# Patient Record
Sex: MALE | Race: WHITE | NOT HISPANIC OR LATINO | URBAN - METROPOLITAN AREA
[De-identification: names, ages, dates, MRNs, and addresses within clinical notes are randomized per-mention and may not be internally consistent; named-entity substitution may affect disease eponyms.]

---

## 2019-01-11 ENCOUNTER — HOSPITAL ENCOUNTER (OUTPATIENT)
Dept: GENERAL RADIOLOGY | Facility: HOSPITAL | Age: 24
Discharge: HOME | End: 2019-01-11
Attending: NEUROLOGICAL SURGERY

## 2019-03-18 ENCOUNTER — HOSPITAL ENCOUNTER (OUTPATIENT)
Dept: PHYSICAL THERAPY | Facility: CLINIC | Age: 24
Discharge: HOME | End: 2019-03-18
Attending: EMERGENCY MEDICINE

## 2020-01-04 ENCOUNTER — HOSPITAL ENCOUNTER (OUTPATIENT)
Dept: URGENT CARE | Facility: CLINIC | Age: 25
Discharge: HOME | End: 2020-01-04
Attending: NURSE PRACTITIONER

## 2020-02-26 ENCOUNTER — HOSPITAL ENCOUNTER (OUTPATIENT)
Dept: URGENT CARE | Facility: CLINIC | Age: 25
Discharge: HOME | End: 2020-02-26
Attending: PHYSICIAN ASSISTANT

## 2020-02-29 LAB — BACTERIA SPEC AEROBE CULT: NORMAL

## 2022-10-05 PROCEDURE — 86665 EPSTEIN-BARR CAPSID VCA: CPT | Performed by: FAMILY MEDICINE

## 2022-10-05 PROCEDURE — 86663 EPSTEIN-BARR ANTIBODY: CPT | Performed by: FAMILY MEDICINE

## 2022-10-05 PROCEDURE — 86664 EPSTEIN-BARR NUCLEAR ANTIGEN: CPT | Performed by: FAMILY MEDICINE

## 2022-10-07 ENCOUNTER — TELEPHONE (OUTPATIENT)
Dept: URGENT CARE | Facility: CLINIC | Age: 27
End: 2022-10-07

## 2022-10-10 ENCOUNTER — TELEPHONE (OUTPATIENT)
Dept: URGENT CARE | Facility: CLINIC | Age: 27
End: 2022-10-10

## 2022-10-10 ENCOUNTER — HOSPITAL ENCOUNTER (EMERGENCY)
Facility: HOSPITAL | Age: 27
Discharge: HOME | End: 2022-10-10
Attending: EMERGENCY MEDICINE | Admitting: EMERGENCY MEDICINE

## 2022-10-10 ENCOUNTER — APPOINTMENT (OUTPATIENT)
Dept: GENERAL RADIOLOGY | Facility: HOSPITAL | Age: 27
End: 2022-10-10

## 2022-10-10 VITALS
WEIGHT: 162.04 LBS | TEMPERATURE: 99.5 F | DIASTOLIC BLOOD PRESSURE: 59 MMHG | BODY MASS INDEX: 20.8 KG/M2 | HEART RATE: 77 BPM | HEIGHT: 74 IN | SYSTOLIC BLOOD PRESSURE: 115 MMHG | RESPIRATION RATE: 19 BRPM | OXYGEN SATURATION: 100 %

## 2022-10-10 DIAGNOSIS — J40 BRONCHITIS: Primary | ICD-10-CM

## 2022-10-10 LAB
FLUAV AG NPH QL: NEGATIVE
FLUBV AG NPH QL IA: NEGATIVE
S PYO AG THROAT QL: NEGATIVE

## 2022-10-10 PROCEDURE — 87880 STREP A ASSAY W/OPTIC: CPT

## 2022-10-10 PROCEDURE — 99283 EMERGENCY DEPT VISIT LOW MDM: CPT

## 2022-10-10 PROCEDURE — 87081 CULTURE SCREEN ONLY: CPT

## 2022-10-10 PROCEDURE — 71045 X-RAY EXAM CHEST 1 VIEW: CPT

## 2022-10-10 PROCEDURE — 87804 INFLUENZA ASSAY W/OPTIC: CPT

## 2022-10-10 PROCEDURE — C9803 HOPD COVID-19 SPEC COLLECT: HCPCS

## 2022-10-10 PROCEDURE — 87147 CULTURE TYPE IMMUNOLOGIC: CPT

## 2022-10-10 PROCEDURE — U0004 COV-19 TEST NON-CDC HGH THRU: HCPCS

## 2022-10-10 RX ORDER — ALBUTEROL SULFATE 90 UG/1
2 AEROSOL, METERED RESPIRATORY (INHALATION) EVERY 4 HOURS PRN
Qty: 6.7 G | Refills: 0 | Status: SHIPPED | OUTPATIENT
Start: 2022-10-10

## 2022-10-10 RX ORDER — BENZONATATE 100 MG/1
100 CAPSULE ORAL 3 TIMES DAILY PRN
Qty: 21 CAPSULE | Refills: 0 | Status: SHIPPED | OUTPATIENT
Start: 2022-10-10

## 2022-10-10 RX ORDER — BROMPHENIRAMINE MALEATE, PSEUDOEPHEDRINE HYDROCHLORIDE, AND DEXTROMETHORPHAN HYDROBROMIDE 2; 30; 10 MG/5ML; MG/5ML; MG/5ML
10 SYRUP ORAL ONCE
Status: COMPLETED | OUTPATIENT
Start: 2022-10-10 | End: 2022-10-10

## 2022-10-10 RX ADMIN — BROMPHENIRAMINE MALEATE, PSEUDOEPHEDRINE HYDROCHLORIDE, AND DEXTROMETHORPHAN HYDROBROMIDE 10 ML: 2; 30; 10 SYRUP ORAL at 20:58

## 2022-10-11 ENCOUNTER — TELEPHONE (OUTPATIENT)
Dept: URGENT CARE | Facility: CLINIC | Age: 27
End: 2022-10-11

## 2022-10-11 LAB — SARS-COV-2 RNA PNL SPEC NAA+PROBE: NOT DETECTED

## 2022-10-11 NOTE — ED PROVIDER NOTES
Subjective   History of Present Illness  Patient is a 27-year-old male presenting today due to productive cough x3 to 4 weeks.  Patient states that he is having productive cough of green, yellow and brown sputum.  He was seen at urgent care last week and given cough medicine, antibiotics which he finished.  Patient states that he was only tested for mono, he was not tested for strep, for COVID or flu.  Patient states that he was not given his mononucleosis results.  States he has not taken anything for the cough in the last couple days.  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 he denies recent travel, fever, chills, nausea or vomiting.    History provided by:  Patient   used: No        Review of Systems   Constitutional: Negative.    HENT: Negative.    Eyes: Negative.    Respiratory: Positive for cough (productive).    Cardiovascular: Negative.    Gastrointestinal: Negative.    Endocrine: Negative.    Genitourinary: Negative.    Musculoskeletal: Negative.    Skin: Negative.    Allergic/Immunologic: Negative.    Neurological: Negative.    Hematological: Negative.    Psychiatric/Behavioral: Negative.        Past Medical History:   Diagnosis Date   • Allergies        No Known Allergies    History reviewed. No pertinent surgical history.    History reviewed. No pertinent family history.    Social History     Socioeconomic History   • Marital status: Single   Tobacco Use   • Smoking status: Never   • Smokeless tobacco: Never   Vaping Use   • Vaping Use: Never used   Substance and Sexual Activity   • Alcohol use: Never   • Drug use: Never   • Sexual activity: Defer           Objective   Physical Exam  Vitals and nursing note reviewed.   Constitutional:       General: He is not in acute distress.     Appearance: Normal appearance. He is normal weight. He is not ill-appearing.   HENT:      Head: Normocephalic and atraumatic.      Nose: Nose  normal.      Mouth/Throat:      Mouth: Mucous membranes are moist.      Pharynx: No oropharyngeal exudate or posterior oropharyngeal erythema.   Eyes:      Extraocular Movements: Extraocular movements intact.      Pupils: Pupils are equal, round, and reactive to light.   Cardiovascular:      Rate and Rhythm: Normal rate and regular rhythm.      Heart sounds: Normal heart sounds.   Pulmonary:      Effort: Pulmonary effort is normal. No respiratory distress.      Breath sounds: Normal breath sounds. No wheezing.   Musculoskeletal:         General: Normal range of motion.      Cervical back: Normal range of motion and neck supple.   Lymphadenopathy:      Cervical: No cervical adenopathy.   Skin:     General: Skin is warm and dry.   Neurological:      General: No focal deficit present.      Mental Status: He is alert and oriented to person, place, and time.   Psychiatric:         Mood and Affect: Mood normal.         Behavior: Behavior normal.         Procedures           ED Course                                           MDM  Number of Diagnoses or Management Options  Diagnosis management comments: The patient presents to the ED with a cough. The patient is resting comfortably and feels better, is alert and in no distress.  On re-examination the patient does not appear toxic and has no meningeal signs (including a negative Kernig and Brudzinski sign), and there's no intractable vomiting, respiratory distress and no apparent pain. Based on the history, exam, diagnostic testing and reassessment, the patient has no signs of meningitis, significant pneumonia, pyelonephritis, sepsis or other acute serious bacterial infections, or other significant pathology to warrant further testing, continued ED treatment, admission or specialist evaluation. The patient's vital signs have been stable. The patient's condition is stable and is appropriate for discharge. The patient´s symptoms are consistent with a viral upper respiratory  infection and antibiotics are not indicated. The pt was counseled to return to the ED for re-evaluation for worsening cough, shortness of breath, uncontrollable headache, uncontrollable fever, altered mental status, or any symptoms which cause them concern. The patient will pursue further outpatient evaluation with the primary care physician or other designated or consultant physician as indicated in the discharge instructions.       Amount and/or Complexity of Data Reviewed  Clinical lab tests: reviewed and ordered  Tests in the radiology section of CPT®: reviewed and ordered    Risk of Complications, Morbidity, and/or Mortality  Presenting problems: low  Diagnostic procedures: low  Management options: low    Patient Progress  Patient progress: stable      Final diagnoses:   Bronchitis       ED Disposition  ED Disposition     ED Disposition   Discharge    Condition   Stable    Comment   --             No follow-up provider specified.       Medication List      New Prescriptions    albuterol sulfate  (90 Base) MCG/ACT inhaler  Commonly known as: PROVENTIL HFA;VENTOLIN HFA;PROAIR HFA  Inhale 2 puffs Every 4 (Four) Hours As Needed for Wheezing.     benzonatate 100 MG capsule  Commonly known as: TESSALON  Take 1 capsule by mouth 3 (Three) Times a Day As Needed for Cough.           Where to Get Your Medications      These medications were sent to [id number] - [place], [place] - [address] AT [facility] - [phone]  - [phone] FX  [address] [place] [address] [place] [address]    Hours: 24-hours Phone: [phone]   · albuterol sulfate  (90 Base) MCG/ACT inhaler  · benzonatate 100 MG capsule          Margarito Mary PA-C  10/10/22 2150

## 2022-10-11 NOTE — DISCHARGE INSTRUCTIONS
as discussed your flu and strep are negative and your chest x-ray shows no signs of pneumonia      Please take Tessalon for cough as prescribed  Please use albuterol inhaler every 4-6 hours as needed for shortness of air  Please look on your MyChart either later tonight or tomorrow for your COVID results  Please drink lots of fluids    Please follow-up with your PCP as needed

## 2022-10-12 LAB — BACTERIA SPEC AEROBE CULT: ABNORMAL

## 2022-10-12 RX ORDER — PENICILLIN V POTASSIUM 500 MG/1
500 TABLET ORAL 2 TIMES DAILY
Qty: 20 TABLET | Refills: 0 | Status: SHIPPED | OUTPATIENT
Start: 2022-10-12 | End: 2022-10-22

## 2024-05-28 ENCOUNTER — APPOINTMENT (OUTPATIENT)
Dept: GENERAL RADIOLOGY | Facility: HOSPITAL | Age: 29
End: 2024-05-28
Payer: MEDICAID

## 2024-05-28 PROCEDURE — 73130 X-RAY EXAM OF HAND: CPT

## 2024-05-28 PROCEDURE — 99283 EMERGENCY DEPT VISIT LOW MDM: CPT

## 2024-05-29 ENCOUNTER — HOSPITAL ENCOUNTER (EMERGENCY)
Facility: HOSPITAL | Age: 29
Discharge: HOME | End: 2024-05-29
Attending: EMERGENCY MEDICINE | Admitting: EMERGENCY MEDICINE
Payer: MEDICAID

## 2024-05-29 VITALS
SYSTOLIC BLOOD PRESSURE: 138 MMHG | BODY MASS INDEX: 23.35 KG/M2 | WEIGHT: 172.4 LBS | RESPIRATION RATE: 20 BRPM | DIASTOLIC BLOOD PRESSURE: 59 MMHG | HEIGHT: 72 IN | OXYGEN SATURATION: 100 % | TEMPERATURE: 98 F | HEART RATE: 78 BPM

## 2024-05-29 DIAGNOSIS — S40.811A ABRASION OF RIGHT UPPER EXTREMITY, INITIAL ENCOUNTER: Primary | ICD-10-CM

## 2024-05-29 DIAGNOSIS — S60.221A CONTUSION OF RIGHT HAND, INITIAL ENCOUNTER: ICD-10-CM

## 2024-05-29 RX ORDER — HYDROCODONE BITARTRATE AND ACETAMINOPHEN 5; 325 MG/1; MG/1
1 TABLET ORAL EVERY 6 HOURS PRN
Qty: 8 TABLET | Refills: 0 | Status: SHIPPED | OUTPATIENT
Start: 2024-05-29

## 2024-05-29 RX ORDER — GINSENG 100 MG
1 CAPSULE ORAL 3 TIMES DAILY
Qty: 454 G | Refills: 0 | Status: SHIPPED | OUTPATIENT
Start: 2024-05-29

## 2024-05-29 RX ORDER — GINSENG 100 MG
1 CAPSULE ORAL ONCE
Status: COMPLETED | OUTPATIENT
Start: 2024-05-29 | End: 2024-05-29

## 2024-05-29 RX ADMIN — BACITRACIN 0.9 G: 500 OINTMENT TOPICAL at 01:28

## 2024-05-29 NOTE — ED PROVIDER NOTES
Time: 10:00 PM EDT  Date of encounter:  5/28/2024  Independent Historian/Clinical History and Information was obtained by:   Patient and Family    History is limited by: N/A    Chief Complaint   Patient presents with    Abrasion         History of Present Illness:  Patient is a 29 y.o. year old male who presents to the emergency department for evaluation of of fiberglass burn/abrasion from an alpine slide. Pt presents to wound to right upper inner arm, right forearm, and to right side lateral side of right hand. Pt reports greatest pain to be to right hand. He rinsed all areas with hydrogen peroxide to clean the areas in flush pieces of fiberglass out of wounds.  He reports he is up-to-date on tetanus. (OPAL Isaacs, APRN)    Patient Care Team  Primary Care Provider: Provider, No Known    Past Medical History:     No Known Allergies  Past Medical History:   Diagnosis Date    Allergies      History reviewed. No pertinent surgical history.  History reviewed. No pertinent family history.    Home Medications:  Prior to Admission medications    Medication Sig Start Date End Date Taking? Authorizing Provider   albuterol sulfate  (90 Base) MCG/ACT inhaler Inhale 2 puffs Every 4 (Four) Hours As Needed for Wheezing. 10/10/22   Margarito Mary PA-C   azithromycin (Zithromax Z-Eros) 250 MG tablet Take as directed 10/5/22   Harley Loco MD   benzonatate (TESSALON) 100 MG capsule Take 1 capsule by mouth 3 (Three) Times a Day As Needed for Cough. 10/10/22   Margarito Mary PA-C   brompheniramine-pseudoephedrine-DM 30-2-10 MG/5ML syrup Take 10 mL by mouth 4 (Four) Times a Day As Needed for Allergies. 3/9/22   Harley Loco MD   brompheniramine-pseudoephedrine-DM 30-2-10 MG/5ML syrup Take 10 mL by mouth 4 (Four) Times a Day As Needed for Congestion. 10/5/22   Harley Loco MD   ketotifen (ZADITOR) 0.025 % ophthalmic solution Administer 1 drop to both eyes 2 (Two) Times a Day.  "3/9/22   Harley Loco MD   predniSONE (DELTASONE) 10 MG tablet 40 mg day one and two 30 mg day three 20 mg day four 10 mg day five 5 mg day six and seven 3/9/22   Harley Loco MD        Social History:   Social History     Tobacco Use    Smoking status: Never    Smokeless tobacco: Never   Vaping Use    Vaping status: Never Used   Substance Use Topics    Alcohol use: Never    Drug use: Never         Review of Systems:  Review of Systems   Constitutional:  Negative for chills and fever.   HENT:  Negative for congestion, rhinorrhea and sore throat.    Eyes:  Negative for pain and visual disturbance.   Respiratory:  Negative for apnea, cough, chest tightness and shortness of breath.    Cardiovascular:  Negative for chest pain and palpitations.   Gastrointestinal:  Negative for abdominal pain, diarrhea, nausea and vomiting.   Genitourinary:  Negative for difficulty urinating and dysuria.   Musculoskeletal:  Negative for joint swelling and myalgias.   Skin:  Negative for color change.   Neurological:  Negative for seizures and headaches.   Psychiatric/Behavioral: Negative.     All other systems reviewed and are negative.       Physical Exam:  /59 (BP Location: Right arm, Patient Position: Sitting)   Pulse 78   Temp 98 °F (36.7 °C) (Oral)   Resp 20   Ht 182.9 cm (72\")   Wt 78.2 kg (172 lb 6.4 oz)   SpO2 100%   BMI 23.38 kg/m²         Physical Exam  Vitals and nursing note reviewed.   Constitutional:       General: He is not in acute distress.     Appearance: Normal appearance. He is not toxic-appearing.   HENT:      Head: Normocephalic and atraumatic.      Jaw: There is normal jaw occlusion.   Eyes:      General: Lids are normal.      Extraocular Movements: Extraocular movements intact.      Conjunctiva/sclera: Conjunctivae normal.      Pupils: Pupils are equal, round, and reactive to light.   Cardiovascular:      Rate and Rhythm: Normal rate and regular rhythm.      Pulses: Normal " pulses.      Heart sounds: Normal heart sounds.   Pulmonary:      Effort: Pulmonary effort is normal. No respiratory distress.      Breath sounds: Normal breath sounds. No wheezing or rhonchi.   Abdominal:      General: Abdomen is flat.      Palpations: Abdomen is soft.      Tenderness: There is no abdominal tenderness. There is no guarding or rebound.   Musculoskeletal:         General: Normal range of motion.      Cervical back: Normal range of motion and neck supple.      Right lower leg: No edema.      Left lower leg: No edema.      Comments: Contusion with swelling dorsal aspect of the right hand, no deformity   Skin:     General: Skin is warm.      Comments: Large abrasions right upper extremity   Neurological:      Mental Status: He is alert and oriented to person, place, and time. Mental status is at baseline.   Psychiatric:         Mood and Affect: Mood normal.                      Procedures:  Procedures      Medical Decision Making:      Comorbidities that affect care:    None    External Notes reviewed:    None      The following orders were placed and all results were independently analyzed by me:  Orders Placed This Encounter   Procedures    XR Hand 3+ View Right       Medications Given in the Emergency Department:  Medications   bacitracin 500 UNIT/GM ointment 0.9 g (0.9 g Topical Given 5/29/24 0128)        ED Course:    The patient was initially evaluated in the triage area where orders were placed. The patient was later dispositioned by Bobby Rondon MD.      The patient was advised to stay for completion of workup which includes but is not limited to communication of labs and radiological results, reassessment and plan. The patient was advised that leaving prior to disposition by a provider could result in critical findings that are not communicated to the patient.     ED Course as of 05/29/24 0140   Tue May 28, 2024   2201   --- PROVIDER IN TRIAGE NOTE ---    Patient was seen and evaluated in  triage by me NILAY Kirkland.  Orders were written and the patient is currently awaiting disposition.   [MS]      ED Course User Index  [MS] Neetu Isaacs NILAY Biggs       Labs:    Lab Results (last 24 hours)       ** No results found for the last 24 hours. **             Imaging:    XR Hand 3+ View Right    Result Date: 5/28/2024  XR HAND 3+ VW RIGHT-: 5/28/2024 10:20 PM  INDICATION: Right hand abrasions and pain.  COMPARISON: None available.  FINDINGS: 3 views of the right hand.  No fracture or dislocation. No bone erosion or destruction.  No foreign body.      Negative right hand.  Electronically Signed By-Dr. Blaise Pacheco MD On:5/28/2024 11:11 PM         Differential Diagnosis and Discussion:      Extremity Pain: Differential diagnosis includes but is not limited to soft tissue sprain, tendonitis, tendon injury, dislocation, fracture, deep vein thrombosis, arterial insufficiency, osteoarthritis, bursitis, and ligamentous damage.    All X-rays impressions were independently interpreted by me.    MDM  Number of Diagnoses or Management Options  Abrasion of right upper extremity, initial encounter  Contusion of right hand, initial encounter  Diagnosis management comments: In summary this is a 29-year-old male patient who presents to the emergency department for evaluation of right upper extremity pain.  He reports injuring it on a slide today.  He has large abrasions that have been cleaned and dressed in the emergency department.  Contusion to the dorsal aspect of the right hand as well, x-ray was unremarkable and negative for fracture.  Prescription for antibiotic ointment and pain control given.  Very strict return to ER and follow-up instructions have been provided to the patient.                   Patient Care Considerations:    ANTIBIOTICS: I considered prescribing antibiotics as an outpatient however no bacterial focus of infection was found.      Consultants/Shared Management  Plan:    None    Social Determinants of Health:    Patient is independent, reliable, and has access to care.       Disposition and Care Coordination:    Discharged: The patient is suitable and stable for discharge with no need for consideration of admission.    I have explained the patient´s condition, diagnoses and treatment plan based on the information available to me at this time. I have answered questions and addressed any concerns. The patient has a good  understanding of the patient´s diagnosis, condition, and treatment plan as can be expected at this point. The vital signs have been stable. The patient´s condition is stable and appropriate for discharge from the emergency department.      The patient will pursue further outpatient evaluation with the primary care physician or other designated or consulting physician as outlined in the discharge instructions. They are agreeable to this plan of care and follow-up instructions have been explained in detail. The patient has received these instructions in written format and has expressed an understanding of the discharge instructions. The patient is aware that any significant change in condition or worsening of symptoms should prompt an immediate return to this or the closest emergency department or call to 911.  I have explained discharge medications and the need for follow up with the patient/caretakers. This was also printed in the discharge instructions. Patient was discharged with the following medications and follow up:      Medication List        New Prescriptions      bacitracin 500 UNIT/GM ointment  Apply 1 Application topically to the appropriate area as directed 3 (Three) Times a Day.     HYDROcodone-acetaminophen 5-325 MG per tablet  Commonly known as: NORCO  Take 1 tablet by mouth Every 6 (Six) Hours As Needed (Pain).               Where to Get Your Medications        These medications were sent to [id number] - [place], [place] - [address] - [phone] [facility] [phone] FX  [address] [place] [address]      Hours: 24-hours Phone: [phone]   bacitracin 500 UNIT/GM ointment  HYDROcodone-acetaminophen 5-325 MG per tablet      Provider, No Known  [facility]  [place] [address]    In 1 week         Final diagnoses:   Abrasion of right upper extremity, initial encounter   Contusion of right hand, initial encounter        ED Disposition       ED Disposition   Discharge    Condition   Stable    Comment   --               This medical record created using voice recognition software.             Bobby Rondon MD  05/29/24 0140

## 2024-05-29 NOTE — ED NOTES
Right arm wounds rinsed with sterile water.  Some fiberglass noted and removed.  Bacitracin ointment applied, covered with non adhesive dressing and wrapped in kerlex.

## 2024-12-17 ENCOUNTER — APPOINTMENT (OUTPATIENT)
Dept: GENERAL RADIOLOGY | Facility: HOSPITAL | Age: 29
End: 2024-12-17
Payer: OTHER MISCELLANEOUS

## 2024-12-17 ENCOUNTER — APPOINTMENT (OUTPATIENT)
Dept: CT IMAGING | Facility: HOSPITAL | Age: 29
End: 2024-12-17
Payer: OTHER MISCELLANEOUS

## 2024-12-17 ENCOUNTER — HOSPITAL ENCOUNTER (EMERGENCY)
Facility: HOSPITAL | Age: 29
Discharge: HOME | End: 2024-12-17
Attending: EMERGENCY MEDICINE | Admitting: EMERGENCY MEDICINE
Payer: OTHER MISCELLANEOUS

## 2024-12-17 VITALS
WEIGHT: 173.06 LBS | OXYGEN SATURATION: 96 % | HEIGHT: 72 IN | RESPIRATION RATE: 16 BRPM | SYSTOLIC BLOOD PRESSURE: 117 MMHG | TEMPERATURE: 98.2 F | HEART RATE: 84 BPM | BODY MASS INDEX: 23.44 KG/M2 | DIASTOLIC BLOOD PRESSURE: 65 MMHG

## 2024-12-17 DIAGNOSIS — S06.0XAA CONCUSSION WITH UNKNOWN LOSS OF CONSCIOUSNESS STATUS, INITIAL ENCOUNTER: Primary | ICD-10-CM
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
## 2024-12-17 LAB
AMPHET+METHAMPHET UR QL: NEGATIVE
AMPHETAMINES UR QL: NEGATIVE
BARBITURATES UR QL SCN: NEGATIVE
BENZODIAZ UR QL SCN: NEGATIVE
BUPRENORPHINE SERPL-MCNC: NEGATIVE NG/ML
CANNABINOIDS SERPL QL: NEGATIVE
COCAINE UR QL: NEGATIVE
FENTANYL UR-MCNC: NEGATIVE NG/ML
METHADONE UR QL SCN: NEGATIVE
OPIATES UR QL: NEGATIVE
OXYCODONE UR QL SCN: NEGATIVE
PCP UR QL SCN: NEGATIVE
TRICYCLICS UR QL SCN: NEGATIVE

## 2024-12-17 PROCEDURE — 72125 CT NECK SPINE W/O DYE: CPT

## 2024-12-17 PROCEDURE — 70450 CT HEAD/BRAIN W/O DYE: CPT

## 2024-12-17 PROCEDURE — 71046 X-RAY EXAM CHEST 2 VIEWS: CPT

## 2024-12-17 PROCEDURE — 80307 DRUG TEST PRSMV CHEM ANLYZR: CPT | Performed by: EMERGENCY MEDICINE

## 2024-12-17 PROCEDURE — 99284 EMERGENCY DEPT VISIT MOD MDM: CPT

## 2024-12-17 NOTE — Clinical Note
[facility] EMERGENCY ROOM  [address] [address]  Phone: [phone]  Fax: [phone]    Alejandro Blackwood was seen and treated in our emergency department on 12/17/2024.  He may return to work on 12/20/2024.         Thank you for choosing [facility].    Donato Hilliard, RN
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
## 2025-04-14 ENCOUNTER — HOSPITAL ENCOUNTER (EMERGENCY)
Facility: HOSPITAL | Age: 30
Discharge: HOME | End: 2025-04-14
Attending: EMERGENCY MEDICINE | Admitting: EMERGENCY MEDICINE
Payer: MEDICAID

## 2025-04-14 VITALS
BODY MASS INDEX: 22.95 KG/M2 | DIASTOLIC BLOOD PRESSURE: 68 MMHG | WEIGHT: 178.79 LBS | HEART RATE: 88 BPM | OXYGEN SATURATION: 99 % | SYSTOLIC BLOOD PRESSURE: 116 MMHG | HEIGHT: 74 IN | RESPIRATION RATE: 16 BRPM | TEMPERATURE: 98.3 F

## 2025-04-14 DIAGNOSIS — J06.9 URI WITH COUGH AND CONGESTION: Primary | ICD-10-CM

## 2025-04-14 LAB
FLUAV RNA RESP QL NAA+PROBE: NOT DETECTED
FLUBV RNA RESP QL NAA+PROBE: NOT DETECTED
RSV RNA RESP QL NAA+PROBE: NOT DETECTED
SARS-COV-2 RNA RESP QL NAA+PROBE: NOT DETECTED

## 2025-04-14 PROCEDURE — 99283 EMERGENCY DEPT VISIT LOW MDM: CPT

## 2025-04-14 PROCEDURE — 87637 SARSCOV2&INF A&B&RSV AMP PRB: CPT | Performed by: EMERGENCY MEDICINE

## 2025-04-14 RX ORDER — AZITHROMYCIN 250 MG/1
TABLET, FILM COATED ORAL
Qty: 6 TABLET | Refills: 0 | Status: SHIPPED | OUTPATIENT
Start: 2025-04-14 | End: 2025-04-19

## 2025-04-14 RX ORDER — ALBUTEROL SULFATE 90 UG/1
2 INHALANT RESPIRATORY (INHALATION) EVERY 4 HOURS PRN
Qty: 18 G | Refills: 0 | Status: SHIPPED | OUTPATIENT
Start: 2025-04-14

## 2025-04-14 RX ORDER — BROMPHENIRAMINE MALEATE, PSEUDOEPHEDRINE HYDROCHLORIDE, AND DEXTROMETHORPHAN HYDROBROMIDE 2; 30; 10 MG/5ML; MG/5ML; MG/5ML
10 SYRUP ORAL 4 TIMES DAILY PRN
Qty: 473 ML | Refills: 0 | Status: SHIPPED | OUTPATIENT
Start: 2025-04-14

## 2025-04-14 RX ORDER — PREDNISONE 20 MG/1
60 TABLET ORAL DAILY
Qty: 15 TABLET | Refills: 0 | Status: SHIPPED | OUTPATIENT
Start: 2025-04-14 | End: 2025-04-19

## 2025-04-14 NOTE — Clinical Note
[facility] EMERGENCY ROOM  [address] [place] [address]  Phone: [phone]  Fax: [phone]    Alejandro Blackwood was seen and treated in our emergency department on 4/14/2025.  He may return to work on 04/16/2025.         Thank you for choosing [facility].    Aida Rios APRN

## 2025-04-15 NOTE — DISCHARGE INSTRUCTIONS
Alternate Tylenol and Motrin.  Fever or pain.    Take medications as prescribed.    COVID flu and RSV was negative
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
